# Patient Record
Sex: FEMALE | Race: WHITE | Employment: FULL TIME | URBAN - METROPOLITAN AREA
[De-identification: names, ages, dates, MRNs, and addresses within clinical notes are randomized per-mention and may not be internally consistent; named-entity substitution may affect disease eponyms.]

---

## 2019-08-07 ENCOUNTER — HOSPITAL ENCOUNTER (EMERGENCY)
Facility: CLINIC | Age: 49
Discharge: HOME | End: 2019-08-07
Attending: EMERGENCY MEDICINE
Payer: COMMERCIAL

## 2019-08-07 VITALS
SYSTOLIC BLOOD PRESSURE: 126 MMHG | RESPIRATION RATE: 18 BRPM | TEMPERATURE: 98.3 F | HEART RATE: 97 BPM | WEIGHT: 189 LBS | HEIGHT: 65 IN | BODY MASS INDEX: 31.49 KG/M2 | DIASTOLIC BLOOD PRESSURE: 87 MMHG | OXYGEN SATURATION: 98 %

## 2019-08-07 DIAGNOSIS — S61.217A LACERATION OF LEFT LITTLE FINGER WITHOUT FOREIGN BODY WITHOUT DAMAGE TO NAIL, INITIAL ENCOUNTER: Primary | ICD-10-CM

## 2019-08-07 PROCEDURE — 2500000003 HC RX 250 WO HCPCS: Performed by: EMERGENCY MEDICINE

## 2019-08-07 PROCEDURE — 6370000000 HC RX 637 (ALT 250 FOR IP): Performed by: EMERGENCY MEDICINE

## 2019-08-07 PROCEDURE — 99282 EMERGENCY DEPT VISIT SF MDM: CPT

## 2019-08-07 PROCEDURE — 12001 RPR S/N/AX/GEN/TRNK 2.5CM/<: CPT

## 2019-08-07 RX ORDER — BACITRACIN, NEOMYCIN, POLYMYXIN B 400; 3.5; 5 [USP'U]/G; MG/G; [USP'U]/G
OINTMENT TOPICAL ONCE
Status: COMPLETED | OUTPATIENT
Start: 2019-08-07 | End: 2019-08-07

## 2019-08-07 RX ORDER — LIDOCAINE HYDROCHLORIDE 10 MG/ML
20 INJECTION, SOLUTION INFILTRATION; PERINEURAL ONCE
Status: COMPLETED | OUTPATIENT
Start: 2019-08-07 | End: 2019-08-07

## 2019-08-07 RX ADMIN — LIDOCAINE HYDROCHLORIDE 20 ML: 10 INJECTION, SOLUTION INFILTRATION; PERINEURAL at 14:27

## 2019-08-07 RX ADMIN — NEOMYCIN AND POLYMYXIN B SULFATES AND BACITRACIN ZINC: 400; 3.5; 5 OINTMENT TOPICAL at 15:05

## 2019-08-07 ASSESSMENT — PAIN SCALES - GENERAL
PAINLEVEL_OUTOF10: 6
PAINLEVEL_OUTOF10: 6

## 2019-08-07 ASSESSMENT — PAIN DESCRIPTION - FREQUENCY: FREQUENCY: CONTINUOUS

## 2019-08-07 ASSESSMENT — PAIN DESCRIPTION - ORIENTATION: ORIENTATION: LEFT

## 2019-08-07 ASSESSMENT — PAIN DESCRIPTION - PAIN TYPE: TYPE: ACUTE PAIN

## 2019-08-07 ASSESSMENT — PAIN DESCRIPTION - LOCATION: LOCATION: FINGER (COMMENT WHICH ONE)

## 2019-08-07 ASSESSMENT — PAIN DESCRIPTION - ONSET: ONSET: SUDDEN

## 2019-08-07 ASSESSMENT — PAIN DESCRIPTION - PROGRESSION: CLINICAL_PROGRESSION: NOT CHANGED

## 2019-08-07 ASSESSMENT — PAIN DESCRIPTION - DESCRIPTORS: DESCRIPTORS: THROBBING;STABBING

## 2021-07-15 ENCOUNTER — HOSPITAL ENCOUNTER (EMERGENCY)
Facility: CLINIC | Age: 51
Discharge: HOME | End: 2021-07-15
Attending: EMERGENCY MEDICINE
Payer: COMMERCIAL

## 2021-07-15 ENCOUNTER — APPOINTMENT (OUTPATIENT)
Dept: CT IMAGING | Facility: CLINIC | Age: 51
End: 2021-07-15
Payer: COMMERCIAL

## 2021-07-15 VITALS
RESPIRATION RATE: 16 BRPM | TEMPERATURE: 98.2 F | HEIGHT: 63 IN | HEART RATE: 85 BPM | OXYGEN SATURATION: 97 % | WEIGHT: 197 LBS | BODY MASS INDEX: 34.91 KG/M2 | SYSTOLIC BLOOD PRESSURE: 133 MMHG | DIASTOLIC BLOOD PRESSURE: 85 MMHG

## 2021-07-15 DIAGNOSIS — S00.83XA CONTUSION OF FACE, INITIAL ENCOUNTER: Primary | ICD-10-CM

## 2021-07-15 PROCEDURE — 99284 EMERGENCY DEPT VISIT MOD MDM: CPT

## 2021-07-15 PROCEDURE — 70486 CT MAXILLOFACIAL W/O DYE: CPT

## 2021-07-15 PROCEDURE — 70450 CT HEAD/BRAIN W/O DYE: CPT

## 2021-07-15 PROCEDURE — 6370000000 HC RX 637 (ALT 250 FOR IP): Performed by: EMERGENCY MEDICINE

## 2021-07-15 RX ORDER — ONDANSETRON 4 MG/1
4 TABLET, ORALLY DISINTEGRATING ORAL ONCE
Status: COMPLETED | OUTPATIENT
Start: 2021-07-15 | End: 2021-07-15

## 2021-07-15 RX ORDER — ACETAMINOPHEN 500 MG
1000 TABLET ORAL ONCE
Status: COMPLETED | OUTPATIENT
Start: 2021-07-15 | End: 2021-07-15

## 2021-07-15 RX ORDER — ONDANSETRON 4 MG/1
4 TABLET, FILM COATED ORAL EVERY 6 HOURS PRN
Qty: 10 TABLET | Refills: 0 | Status: SHIPPED | OUTPATIENT
Start: 2021-07-15

## 2021-07-15 RX ORDER — IBUPROFEN 600 MG/1
600 TABLET ORAL 3 TIMES DAILY PRN
Qty: 15 TABLET | Refills: 0 | Status: SHIPPED | OUTPATIENT
Start: 2021-07-15

## 2021-07-15 RX ADMIN — ONDANSETRON 4 MG: 4 TABLET, ORALLY DISINTEGRATING ORAL at 13:13

## 2021-07-15 RX ADMIN — ACETAMINOPHEN 1000 MG: 500 TABLET ORAL at 13:45

## 2021-07-15 ASSESSMENT — PAIN DESCRIPTION - PROGRESSION
CLINICAL_PROGRESSION: NOT CHANGED
CLINICAL_PROGRESSION_2: NOT CHANGED

## 2021-07-15 ASSESSMENT — PAIN DESCRIPTION - DURATION: DURATION_2: CONTINUOUS

## 2021-07-15 ASSESSMENT — PAIN DESCRIPTION - LOCATION
LOCATION: HEAD
LOCATION_2: FACE
LOCATION: HEAD

## 2021-07-15 ASSESSMENT — PAIN DESCRIPTION - ONSET
ONSET: SUDDEN
ONSET_2: SUDDEN

## 2021-07-15 ASSESSMENT — PAIN DESCRIPTION - ORIENTATION
ORIENTATION: RIGHT;ANTERIOR
ORIENTATION_2: RIGHT;MID

## 2021-07-15 ASSESSMENT — PAIN DESCRIPTION - INTENSITY: RATING_2: 2

## 2021-07-15 ASSESSMENT — PAIN DESCRIPTION - FREQUENCY: FREQUENCY: CONTINUOUS

## 2021-07-15 ASSESSMENT — PAIN DESCRIPTION - DESCRIPTORS
DESCRIPTORS: ACHING
DESCRIPTORS_2: ACHING

## 2021-07-15 ASSESSMENT — PAIN DESCRIPTION - PAIN TYPE
TYPE: ACUTE PAIN
TYPE: ACUTE PAIN

## 2021-07-15 ASSESSMENT — PAIN SCALES - GENERAL
PAINLEVEL_OUTOF10: 10
PAINLEVEL_OUTOF10: 4
PAINLEVEL_OUTOF10: 4

## 2021-07-15 NOTE — LETTER
[facility] [place] [facility] ED  [address] [facility]  Phone: [phone]               July 15, 2021    Patient: Caitlin Aleman   YOB: 1970   Date of Visit: 7/15/2021       To Whom It May Concern:    Yulissa Troy was seen and treated in our emergency department on 7/15/2021. She may return to work on 7/17/21.       Sincerely,       Alena Sheppard RN         Signature:__________________________________

## 2021-07-15 NOTE — ED PROVIDER NOTES
[address] ED  EMERGENCY DEPARTMENT ENCOUNTER      Pt Name: Mis Garcia  MRN: [id number]  [place] 1970  Date of evaluation: 7/15/2021  Provider: Estela Schultz MD    CHIEF COMPLAINT     Chief Complaint   Patient presents with    Facial Injury     Box fell about 1-2 feet and hit face.  Headache    Nausea    Dizziness         HISTORY OF PRESENT ILLNESS   (Location/Symptom, Timing/Onset, Context/Setting,Quality, Duration, Modifying Factors, Severity)  Note limiting factors. Mis Garcia is a 46 y.o. female who presents to the emergency department stating around 8 AM today while at work a heavy box fell on her face. She injured her forehead and nose. She has had progressively worsening headache and nausea since then. There was no loss of consciousness. She denies neck pain. The history is provided by the patient. Nursing Notes werereviewed. REVIEW OF SYSTEMS    (2-9 systems for level 4, 10 or more for level 5)     Review of Systems   All other systems reviewed and are negative. Except as noted above the remainder of the review of systems was reviewed and negative. PAST MEDICAL HISTORY     Past Medical History:   Diagnosis Date    Colitis     Diverticulitis of intestine     GERD (gastroesophageal reflux disease)          SURGICALHISTORY       Past Surgical History:   Procedure Laterality Date    CHOLECYSTECTOMY           CURRENT MEDICATIONS       Previous Medications    LEVONORGESTREL-ETHINYL ESTRADIOL (AVIANE) 0.1-20 MG-MCG PER TABLET    Take 1 tablet by mouth daily. Needs annual soon       ALLERGIES     Ciprofloxacin, Macadamia nut oil, Metronidazole, Penicillins, Tree extract, Azithromycin, and Morphine    FAMILY HISTORY     History reviewed. No pertinent family history.        SOCIAL HISTORY       Social History     Socioeconomic History    Marital status:      Spouse name: None    Number of children: None    Years of education: None    Highest education level: None   Occupational History    None   Tobacco Use    Smoking status: Never Smoker    Smokeless tobacco: Never Used   Vaping Use    Vaping Use: Never used   Substance and Sexual Activity    Alcohol use: Yes     Alcohol/week: 1.0 standard drinks     Types: 1 Glasses of wine per week    Drug use: Never    Sexual activity: None   Other Topics Concern    None   Social History Narrative    None     Social Determinants of Health     Financial Resource Strain:     Difficulty of Paying Living Expenses:    Food Insecurity:     Worried About Running Out of Food in the Last Year:     Ran Out of Food in the Last Year:    Transportation Needs:     Lack of Transportation (Medical):  Lack of Transportation (Non-Medical):    Physical Activity:     Days of Exercise per Week:     Minutes of Exercise per Session:    Stress:     Feeling of Stress :    Social Connections:     Frequency of Communication with Friends and Family:     Frequency of Social Gatherings with Friends and Family:     Attends [employer] Services:     Active Member of Clubs or Organizations:     Attends Club or Organization Meetings:     Marital Status:    Intimate Partner Violence:     Fear of Current or Ex-Partner:     Emotionally Abused:     Physically Abused:     Sexually Abused:        SCREENINGS             PHYSICAL EXAM    (up to 7 for level 4, 8 or more for level 5)     ED Triage Vitals [07/15/21 1223]   BP Temp Temp Source Pulse Resp SpO2 Height Weight   133/85 98.2 °F (36.8 °C) Oral 85 16 97 % 5' 3\" (1.6 m) 197 lb (89.4 kg)       Physical Exam  Vitals reviewed. Constitutional:       General: She is not in acute distress. Appearance: She is not ill-appearing. HENT:      Head: Normocephalic. Comments: Patient localizes tenderness just above the right eyebrow and over the right nasal bone. There is no underlying bony crepitus. No sign of epistaxis is noted on examination of the nasal cavities. Right Ear: External ear normal.      Left Ear: External ear normal.      Nose: Nose normal.   Eyes:      Pupils: Pupils are equal, round, and reactive to light. Cardiovascular:      Rate and Rhythm: Normal rate and regular rhythm. Heart sounds: Normal heart sounds. Pulmonary:      Effort: Pulmonary effort is normal.      Breath sounds: Normal breath sounds. Skin:     General: Skin is warm and dry. Neurological:      General: No focal deficit present. Mental Status: She is alert and oriented to person, place, and time. DIAGNOSTIC RESULTS     EKG: All EKG's are interpreted by the Emergency Department Physician who either signs orCo-signs this chart in the absence of a cardiologist.    RADIOLOGY:     Interpretation per the Radiologist below, ifavailable at the time of this note:    CT Head WO Contrast   Final Result   No acute intracranial abnormality. CT FACIAL BONES WO CONTRAST   Final Result   No acute traumatic injury of the facial bones. ED BEDSIDE ULTRASOUND:   Performed by ED Physician - none    LABS:  Labs Reviewed - No data to display    All other labs were within normal range ornot returned as of this dictation. EMERGENCY DEPARTMENT COURSE and DIFFERENTIAL DIAGNOSIS/MDM:   Vitals:    Vitals:    07/15/21 1223   BP: 133/85   Pulse: 85   Resp: 16   Temp: 98.2 °F (36.8 °C)   TempSrc: Oral   SpO2: 97%   Weight: 89.4 kg (197 lb)   Height: 5' 3\" (1.6 m)            CT imaging of the face and head is negative. Patient is treated with oral acetaminophen and with Zofran in the ED for symptomatic relief of headache and nausea. Upon discharge she is prescribed ibuprofen and Zofran and given a work note. Follow-up instructions are provided. Her injuries consistent with contusion to the face and I do not suspect closed head injury at this time.     MDM     CONSULTS:  None    PROCEDURES:  Unlessotherwise noted below, none     Procedures    FINAL IMPRESSION      1. Contusion of face, initial encounter          DISPOSITION/PLAN   DISPOSITION Decision To Discharge 07/15/2021 01:48:42 PM      PATIENT REFERRED TO:  No follow-up provider specified. DISCHARGE MEDICATIONS:         Problem List:  There is no problem list on file for this patient. Summation      Patient Course: Discharged. ED Medicationsadministered this visit:    Medications   ondansetron (ZOFRAN-ODT) disintegrating tablet 4 mg (4 mg Oral Given 7/15/21 1313)   acetaminophen (TYLENOL) tablet 1,000 mg (1,000 mg Oral Given 7/15/21 1345)       New Prescriptions from this visit:    New Prescriptions    IBUPROFEN (ADVIL;MOTRIN) 600 MG TABLET    Take 1 tablet by mouth 3 times daily as needed for Pain (Take with food.)    ONDANSETRON (ZOFRAN) 4 MG TABLET    Take 1 tablet by mouth every 6 hours as needed for Nausea or Vomiting       Follow-up:  No follow-up provider specified. Final Impression:   1.  Contusion of face, initial encounter               (Please note that portions of this note were completed with a voice recognitionprogram.  Efforts were made to edit the dictations but occasionally words are mis-transcribed.)    Desi Gallagher MD (electronically signed)  Attending Emergency Physician            Desi Gallagher MD  07/15/21 [phone]

## 2023-05-02 ENCOUNTER — HOSPITAL ENCOUNTER (EMERGENCY)
Facility: CLINIC | Age: 53
Discharge: HOME | End: 2023-05-02
Attending: SPECIALIST
Payer: COMMERCIAL

## 2023-05-02 VITALS
WEIGHT: 200 LBS | SYSTOLIC BLOOD PRESSURE: 161 MMHG | RESPIRATION RATE: 18 BRPM | BODY MASS INDEX: 35.44 KG/M2 | TEMPERATURE: 97.9 F | DIASTOLIC BLOOD PRESSURE: 101 MMHG | OXYGEN SATURATION: 96 % | HEIGHT: 63 IN | HEART RATE: 81 BPM

## 2023-05-02 DIAGNOSIS — S61.214A LACERATION OF RIGHT RING FINGER WITHOUT FOREIGN BODY WITHOUT DAMAGE TO NAIL, INITIAL ENCOUNTER: Primary | ICD-10-CM

## 2023-05-02 PROCEDURE — 6370000000 HC RX 637 (ALT 250 FOR IP): Performed by: SPECIALIST

## 2023-05-02 PROCEDURE — 99283 EMERGENCY DEPT VISIT LOW MDM: CPT

## 2023-05-02 PROCEDURE — 2500000003 HC RX 250 WO HCPCS: Performed by: SPECIALIST

## 2023-05-02 PROCEDURE — 12001 RPR S/N/AX/GEN/TRNK 2.5CM/<: CPT

## 2023-05-02 RX ORDER — LIDOCAINE HYDROCHLORIDE 10 MG/ML
20 INJECTION, SOLUTION INFILTRATION; PERINEURAL ONCE
Status: COMPLETED | OUTPATIENT
Start: 2023-05-02 | End: 2023-05-02

## 2023-05-02 RX ORDER — GINSENG 100 MG
CAPSULE ORAL ONCE
Status: COMPLETED | OUTPATIENT
Start: 2023-05-02 | End: 2023-05-02

## 2023-05-02 RX ADMIN — BACITRACIN: 500 OINTMENT TOPICAL at 07:18

## 2023-05-02 RX ADMIN — LIDOCAINE HYDROCHLORIDE 20 ML: 10 INJECTION, SOLUTION INFILTRATION; PERINEURAL at 07:20

## 2023-05-02 ASSESSMENT — PAIN DESCRIPTION - FREQUENCY: FREQUENCY: CONTINUOUS

## 2023-05-02 ASSESSMENT — ENCOUNTER SYMPTOMS
SHORTNESS OF BREATH: 0
COUGH: 0

## 2023-05-02 ASSESSMENT — PAIN - FUNCTIONAL ASSESSMENT: PAIN_FUNCTIONAL_ASSESSMENT: 0-10

## 2023-05-02 ASSESSMENT — PAIN DESCRIPTION - PAIN TYPE: TYPE: ACUTE PAIN

## 2023-05-02 ASSESSMENT — PAIN SCALES - GENERAL: PAINLEVEL_OUTOF10: 5

## 2023-05-02 ASSESSMENT — PAIN DESCRIPTION - DESCRIPTORS: DESCRIPTORS: THROBBING

## 2023-05-02 ASSESSMENT — PAIN DESCRIPTION - ORIENTATION: ORIENTATION: RIGHT

## 2023-05-02 NOTE — ED PROVIDER NOTES
[facility]  [address] [facility]  Phone: [phone]      Pt Name: Kelton Mukherjee  MRN: [id number]  [place] 1970  Date of evaluation: 5/2/2023      CHIEF COMPLAINT       Chief Complaint   Patient presents with    Laceration     Right 4th finger, cut with a knife at work around [employer] hour pta         HISTORY OF PRESENT ILLNESS    Kelton Mukherjee is a 48 y.o. female who presents   Chief Complaint   Patient presents with    Laceration     Right 4th finger, cut with a knife at work around [employer] hour pta   . 51-year-old female patient presents to the emergency department for evaluation of laceration on the volar aspect of the right ring finger in the terminal phalangeal area with a knife accidentally while at work at [employer] about 1 hour prior to arrival.  She denies any tingling, numbness or weakness distally. She is left-hand dominant. No history of diabetes mellitus. She does not take any blood thinners. Bleeding has been under control with localized pressure. She is non-smoker. Last tetanus injection 4 years ago. She denies any other injuries. REVIEW OF SYSTEMS     Review of Systems   Constitutional:  Negative for chills and fever. Respiratory:  Negative for cough and shortness of breath. Cardiovascular:  Negative for chest pain and palpitations. Neurological:  Negative for dizziness, weakness, light-headedness and numbness. All other systems reviewed and are negative. PAST MEDICAL HISTORY    has a past medical history of Colitis, Diverticulitis of intestine, and GERD (gastroesophageal reflux disease). SURGICAL HISTORY      has a past surgical history that includes Cholecystectomy.     CURRENT MEDICATIONS       Previous Medications    IBUPROFEN (ADVIL;MOTRIN) 600 MG TABLET    Take 1 tablet by mouth 3 times daily as needed for Pain (Take with food.)       ALLERGIES     is allergic to ciprofloxacin, macadamia nut oil, metronidazole, penicillins, tree extract,

## 2023-05-02 NOTE — ED NOTES
Pt. Ambulatory to room # 11 from waiting area, gait steady. Pt. C/o cutting right 4th finger on a  knife at work one hour pta. Pt. Has dressing on wound and bleeding controlled at this time. Pt. Had a tetanus 4 years ago. Pt. Alert and oriented x4. RR equal and non labored. NaD noted. Call light within reach.      Kimberly Mason RN  05/02/23 [id number]

## 2023-05-02 NOTE — DISCHARGE INSTRUCTIONS
PLEASE RETURN TO THE EMERGENCY DEPARTMENT IMMEDIATELY if your symptoms worsen in anyway. You should immediately return to the ER for symptoms such as increasing pain, fever, drainage from the wound, warmth or redness around the cut, increasing swelling, numbness or weakness to the extremity, color change or coldness to the extremity    Take your medication as indicated and prescribed. If you are given an antibiotic then, make sure you get the prescription filled and take the antibiotics until finished. It is advised that you keep your wound clean and dry. You may apply antibiotic ointment to the area. Please understand that at this time there is no evidence for a more serious underlying process, but that early in the process of an illness or injury, an emergency department workup can be falsely reassuring. You should contact your family doctor within the next 48 hours for a follow up appointment. Erick Mirza!!!    From [facility] and [address] [place] [address] Emergency Services    On behalf of the Emergency Department staff at [facility]), I would like to thank you for giving us the opportunity to address your health care needs and concerns. We hope that during your visit, our service was delivered in a professional and caring manner. Please keep [facility] ([facility]) in mind as we walk with you down the path to your own personal wellness. Please expect an automated text message or email from us so we can ask a few questions about your health and progress. Based on your answers, a clinician may call you back to offer help and instructions. Please understand that early in the process of an illness or injury, an emergency department workup can be falsely reassuring. If you notice any worsening, changing or persistent symptoms please call your family doctor or return to the ER immediately. Tell us how we did during your visit at [email or web address]   and let us know about your experience

## 2023-12-11 ENCOUNTER — HOSPITAL ENCOUNTER (EMERGENCY)
Facility: CLINIC | Age: 53
Discharge: HOME | End: 2023-12-11
Attending: EMERGENCY MEDICINE
Payer: COMMERCIAL

## 2023-12-11 VITALS
SYSTOLIC BLOOD PRESSURE: 133 MMHG | HEART RATE: 94 BPM | WEIGHT: 210 LBS | OXYGEN SATURATION: 98 % | DIASTOLIC BLOOD PRESSURE: 77 MMHG | HEIGHT: 63 IN | RESPIRATION RATE: 17 BRPM | BODY MASS INDEX: 37.21 KG/M2 | TEMPERATURE: 97.9 F

## 2023-12-11 DIAGNOSIS — S61.309A NAIL AVULSION, FINGER, INITIAL ENCOUNTER: Primary | ICD-10-CM

## 2023-12-11 PROCEDURE — 99282 EMERGENCY DEPT VISIT SF MDM: CPT

## 2023-12-11 ASSESSMENT — PAIN - FUNCTIONAL ASSESSMENT: PAIN_FUNCTIONAL_ASSESSMENT: WONG-BAKER FACES

## 2023-12-11 ASSESSMENT — PAIN SCALES - WONG BAKER: WONGBAKER_NUMERICALRESPONSE: 2

## 2023-12-11 NOTE — ED PROVIDER NOTES
[address] [facility] ENCOUNTER      Pt Name: Luis Felipe Madrigal  MRN: [id number]  [address] [facility] [place] 1970  Date of evaluation: 12/11/2023      CHIEF COMPLAINT       Chief Complaint   Patient presents with    Finger Pain     Left hand index finger laceration to nail. Tetanus is up to date         HISTORY OF PRESENT ILLNESS      The patient presents with an injury to her left index finger. She was cleaning a mixer and the blade slipped and cut her nail. It avulsed the left index finger nail completely off. Bleeding is controlled. She has no other injury. Her tetanus is up-to-date having received it last year. She has no other injury. The patient is left-handed. She does not a history of bleeding disorders. REVIEW OF SYSTEMS       All systems reviewed and negative unless noted in HPI. The patient denies fever or constitutional symptoms. Denies any neck pain or stiffness. Denies chest pain or shortness of breath. Left index finger injury as noted in HPI. Denies any weakness, numbness or focal neurologic deficit. Denies any skin rash or edema. No recent psychiatric issues. No easy bruising or bleeding. Denies any polyuria, polydypsia or history of immunocompromise. PAST MEDICAL HISTORY    has a past medical history of Colitis, Diverticulitis of intestine, and GERD (gastroesophageal reflux disease). SURGICAL HISTORY      has a past surgical history that includes Cholecystectomy. CURRENT MEDICATIONS       Previous Medications    IBUPROFEN (ADVIL;MOTRIN) 600 MG TABLET    Take 1 tablet by mouth 3 times daily as needed for Pain (Take with food.)       ALLERGIES     is allergic to ciprofloxacin, macadamia nut oil, metronidazole, penicillins, tree extract, azithromycin, and morphine. FAMILY HISTORY     has no family status information on file. family history is not on file. SOCIAL HISTORY      reports that she has never smoked.  She has never

## 2023-12-11 NOTE — DISCHARGE INSTRUCTIONS
Keep wound clean and dry. Change dressing daily. Follow-up with your Worker's Compensation provider. Return for worsening pain, swelling, drainage, bleeding, fever, or if worse in any way. PLEASE RETURN TO THE EMERGENCY DEPARTMENT IMMEDIATELY if your symptoms worsen in anyway. You should immediately return to the ER for symptoms such as increasing pain, fever, drainage from the wound, warmth or redness around the cut, increasing swelling, numbness or weakness to the extremity, color change or coldness to the extremity    Take your medication as indicated and prescribed. If you are given an antibiotic then, make sure you get the prescription filled and take the antibiotics until finished. It is advised that you keep your wound clean and dry. You may apply antibiotic ointment to the area. Please understand that at this time there is no evidence for a more serious underlying process, but that early in the process of an illness or injury, an emergency department workup can be falsely reassuring. You should contact your family doctor within the next 48 hours for a follow up appointment. [facility]!!!    From [facility]) and [facility] Emergency Services    On behalf of the Emergency Department staff at [facility]), I would like to thank you for giving us the opportunity to address your health care needs and concerns. We hope that during your visit, our service was delivered in a professional and caring manner. Please keep [facility]) in mind as we walk with you down the path to your own personal wellness. Please expect an automated text message or email from us so we can ask a few questions about your health and progress. Based on your answers, a clinician may call you back to offer help and instructions. Please understand that early in the process of an illness or injury, an emergency department workup can be falsely reassuring.   If you notice any worsening, changing or persistent